# Patient Record
(demographics unavailable — no encounter records)

---

## 2025-07-22 NOTE — PLAN
[FreeTextEntry1] : pap and blood sent to lab. pt to schedule sono appt.   Pt verbalized understanding of tx plan. All questions were answered to the best of my ability. Pt encouraged to call the office sooner for any questions or concerns and to f/u prn.

## 2025-07-22 NOTE — HISTORY OF PRESENT ILLNESS
[FreeTextEntry1] : pt, , presents for annual exam. pt also states she would like to be tested to see if she has PCOS due to difficulty losing weight and facial hair growth.   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  [LMPDate] : 7/12/25 [MensesFreq] : 30 [MensesLength] : 3

## 2025-07-22 NOTE — PHYSICAL EXAM
[RN] : RN [FreeTextEntry2] : Sally [Appropriately responsive] : appropriately responsive [Alert] : alert [No Acute Distress] : no acute distress [No Lymphadenopathy] : no lymphadenopathy [Regular Rate Rhythm] : regular rate rhythm [No Murmurs] : no murmurs [Soft] : soft [Non-tender] : non-tender [Non-distended] : non-distended [No HSM] : No HSM [No Lesions] : no lesions [No Mass] : no mass [Oriented x3] : oriented x3 [FreeTextEntry5] : right lower lobe wheeze, pt endorses she did not take her inhaler this morning.  [Examination Of The Breasts] : a normal appearance [No Masses] : no breast masses were palpable [Labia Minora] : normal [Labia Majora] : normal [Normal] : normal [Uterine Adnexae] : normal

## 2025-07-30 NOTE — CONSULT LETTER
[FreeTextEntry1] : Shelby Gardiner NP [FreeTextEntry2] : Elevated Prolactin   [FreeTextEntry3] : Patient is a 25-year-old female seen by OB/GYN for routine annual gynecologic exam.  Patient was describing some trouble losing weight and increased facial hair. No irregular.menses, no breast discharge, no acne Past medical history Asthma  Past surgical history None  Family history Mother-breast cancer  Social history Social alcohol use Uses cannabis Marleny Former smoker  Allergies Penicillin  Medications None    Labs 	TSH	4.91 uIU/mL	H	0.27-4.20 Luteinizing Hormone	19.2 IU/L 	Follicle Stimulating Hormone	4.5 IU/L Testosterone, Free	3.4 pg/mL	 	0.1-6.3	 	Testosterone	67.0 ng/dL	H	8.4-48.1 Estradiol	277 pg/mL	 	 	 17 Hydroxyprogesterone	51 ng/dL   	Prolactin	46.1 ng/mL	H	3.4-24.1    [FreeTextEntry4] : Patient is a 25-year-old female found to have elevated prolactin as well as elevated total testosterone and TSH  on evaluation for weight gain and hirsutism No history of irregular menses or breast discharge. Would recheck following labs before 8 AM, with patient holding any biotin containing supplements for 3 days prior, as well as avoiding any breast stimulation for 3 days prior.  In addition cannabis can raise prolactin so would have patient hold this for 1 week  Please check following labs before 8 AM MacroProlactin TSH T4 LH FSH Free and total testosterone  DHEA-S ACTH Cortisol IGF-I Growth hormone Thyroid antibody panel A1c  [FreeTextEntry5] : If there are any further questions please reach out to me